# Patient Record
Sex: MALE | Race: WHITE | NOT HISPANIC OR LATINO | Employment: OTHER | URBAN - METROPOLITAN AREA
[De-identification: names, ages, dates, MRNs, and addresses within clinical notes are randomized per-mention and may not be internally consistent; named-entity substitution may affect disease eponyms.]

---

## 2022-10-18 PROBLEM — I21.02 STEMI INVOLVING LEFT ANTERIOR DESCENDING CORONARY ARTERY: Status: ACTIVE | Noted: 2022-08-03

## 2022-10-18 PROBLEM — E78.2 MIXED HYPERLIPIDEMIA: Status: ACTIVE | Noted: 2022-08-03

## 2022-10-18 RX ORDER — ASPIRIN 81 MG/1
81 TABLET ORAL DAILY
COMMUNITY
Start: 2022-08-04

## 2022-10-18 RX ORDER — ROSUVASTATIN CALCIUM 20 MG/1
20 TABLET, COATED ORAL DAILY
COMMUNITY
Start: 2022-08-04 | End: 2022-10-19

## 2022-10-18 RX ORDER — METOPROLOL SUCCINATE 25 MG/1
25 TABLET, EXTENDED RELEASE ORAL DAILY
COMMUNITY
Start: 2022-08-04

## 2022-10-18 RX ORDER — NITROGLYCERIN 0.4 MG/1
0.4 TABLET SUBLINGUAL
COMMUNITY
Start: 2022-08-04

## 2022-10-18 NOTE — PROGRESS NOTES
Encounter Date:10/19/2022      Patient ID: Luis Miguel Salazar is a 61 y.o. male.    Chief Complaint   Patient presents with   • Consult          History of Present Illness  Luis Miguel is a 61-year-old with past medical history of hypertension, hyperlipidemia, tobacco abuse and a recent anterior wall STEMI status post PCI to the LAD who presents to [facility] care.    [facility] with anterior STEMI.  He was found to have 99% mid LAD stenosis with thrombus and JULISSA II flow.  He underwent Pronto thrombectomy and stenting of the mid LAD with placement of a 3 oh by 20 mm Synergy drug-eluting stent postdilated with a 3.5 mm noncompliant balloon.  There was noted to be 30% plaque beyond the stented segment.  Is also noted to have 30 to 40% stenosis in the proximal LAD.  Left circumflex is a dominant vessel with 30% stenosis in the mid vessel.  RCA is a nondominant vessel with 50% stenosis in the proximal segment.  Echocardiogram showed LVEF of 55 to 60% with severe apical and septal hypokinesis.  No valvular heart disease.    Since then he has been doing well and denies any further episodes of chest pain.  He does mention that he has been getting short of breath with minimal exertion.  This is new since his PCI.  As mentioned above his echocardiogram was within normal limits.  He has been taking his Brilinta.  Denies any bleeding issues.  Denies any orthopnea, PND, or lower extremity edema.  Has not started cardiac rehab at this time.    He continues to smoke about 1 pack/day.  He was smoking up to 2 packs/day up until a few months ago.  He would like to quit but he does not want to use any aids at this time.  Discussed risks of tobacco and smoking for his cardiac health.    The following portions of the patient's history were reviewed and updated as appropriate: allergies, current medications, past family history, past medical history, past social history, past surgical history, and problem  list.    Review of Systems   Constitutional: Negative for fever and malaise/fatigue.   Cardiovascular: Negative for chest pain, dyspnea on exertion and palpitations.   Respiratory: Negative for cough and shortness of breath.    Skin: Negative for rash.   Gastrointestinal: Negative for abdominal pain, nausea and vomiting.   Neurological: Negative for focal weakness and headaches.   All other systems reviewed and are negative.        Current Outpatient Medications:   •  aspirin 81 MG EC tablet, Take 1 tablet by mouth Daily., Disp: , Rfl:   •  metoprolol succinate XL (TOPROL-XL) 25 MG 24 hr tablet, Take 1 tablet by mouth Daily., Disp: , Rfl:   •  nitroglycerin (NITROSTAT) 0.4 MG SL tablet, Place 1 tablet under the tongue., Disp: , Rfl:   •  clopidogrel (PLAVIX) 75 MG tablet, Take 1 tablet by mouth Daily., Disp: 90 tablet, Rfl: 3  •  rosuvastatin (CRESTOR) 40 MG tablet, Take 1 tablet by mouth Daily., Disp: 90 tablet, Rfl: 3    No Known Allergies    Family History   Problem Relation Age of Onset   • Lung cancer Mother    • Heart disease Father        Past Surgical History:   Procedure Laterality Date   • CARDIAC CATHETERIZATION     • CORONARY STENT PLACEMENT     • HERNIA REPAIR     • LIPOMA RESECTION     • LUMBAR SPINE SURGERY     • THROMBECTOMY         Past Medical History:   Diagnosis Date   • Hyperlipidemia    • Myocardial infarction (HCC)    • Spinal stenosis, multiple sites in spine    • STEMI (ST elevation myocardial infarction) (HCC)    • Thrombus        Social History     Socioeconomic History   • Marital status:    Tobacco Use   • Smoking status: Every Day     Types: Cigarettes   • Smokeless tobacco: Never   Vaping Use   • Vaping Use: Never used   Substance and Sexual Activity   • Alcohol use: Not Currently   • Drug use: Not Currently     Types: Marijuana   • Sexual activity: Defer           ECG 12 Lead    Date/Time: 10/19/2022 2:27 PM  Performed by: Zoya Monahan MD  Authorized by: Zoya Monahan,  "MD   Comparison: not compared with previous ECG   Previous ECG: no previous ECG available  Rhythm: sinus rhythm  Rate: normal  BPM: 68  Conduction: conduction normal  QRS axis: normal  Comments: Early R wave progression with T wave inversions in V2 and V3              Objective:       Physical Exam    /90   Pulse 73   Ht 175.3 cm (69\")   Wt 68.7 kg (151 lb 6.4 oz)   SpO2 97%   BMI 22.36 kg/m²   The patient is alert, oriented and in no distress.  Walks with a cane    Vital signs as noted above.    Head and neck revealed no carotid bruits or jugular venous distension.  No thyromegaly or lymphadenopathy is present.    Lungs clear.  No wheezing.  Breath sounds are normal bilaterally.    Heart normal first and second heart sounds.  No murmur..  No pericardial rub is present.  No gallop is present.    Abdomen soft and nontender.  No organomegaly is present.    Extremities revealed good peripheral pulses without any pedal edema.    Skin warm and dry.    Musculoskeletal system is grossly normal.    CNS grossly normal.           Diagnosis Plan   1. STEMI involving left anterior descending coronary artery (HCC)        2. Status post insertion of drug-eluting stent into left anterior descending (LAD) artery for coronary artery disease  Ambulatory Referral to Cardiac Rehab      3. Mixed hyperlipidemia        4. Essential hypertension        5. Tobacco abuse        LAB RESULTS (LAST 7 DAYS)    CBC        BMP        CMP         BNP        TROPONIN        CoAg        Creatinine Clearance  CrCl cannot be calculated (Patient's most recent lab result is older than the maximum 30 days allowed.).    ABG        Radiology  No radiology results for the last day         Assessment and Plan       Diagnoses and all orders for this visit:    1. STEMI involving left anterior descending coronary artery (HCC) (Primary)  Assessment & Plan:  Status post PCI of the LAD  Switch Brilinta to Plavix given shortness of breath  Continue with " aspirin  Statin  Metoprolol  Referral to cardiac rehab      2. Status post insertion of drug-eluting stent into left anterior descending (LAD) artery for coronary artery disease  -     Ambulatory Referral to Cardiac Rehab    3. Mixed hyperlipidemia  Assessment & Plan:  Increase rosuvastatin to 40 mg  Repeat lipid panel in 3 months      4. Essential hypertension  Assessment & Plan:  Continue with metoprolol      5. Tobacco abuse  Assessment & Plan:  I have provided smoking cessation counseling for the patient today. I extensively discussed with the patient the cardiovascular risks associated with smoking and other tobacco products. Patient stated understanding and their questions were answered to their satisfaction.  Currently does not want to use any aids      Other orders  -     clopidogrel (PLAVIX) 75 MG tablet; Take 1 tablet by mouth Daily.  Dispense: 90 tablet; Refill: 3  -     rosuvastatin (CRESTOR) 40 MG tablet; Take 1 tablet by mouth Daily.  Dispense: 90 tablet; Refill: 3         Zoya Monahan MD

## 2022-10-19 ENCOUNTER — OFFICE VISIT (OUTPATIENT)
Dept: CARDIOLOGY | Facility: CLINIC | Age: 62
End: 2022-10-19

## 2022-10-19 VITALS
OXYGEN SATURATION: 97 % | HEIGHT: 69 IN | WEIGHT: 151.4 LBS | BODY MASS INDEX: 22.42 KG/M2 | DIASTOLIC BLOOD PRESSURE: 90 MMHG | SYSTOLIC BLOOD PRESSURE: 145 MMHG | HEART RATE: 73 BPM

## 2022-10-19 DIAGNOSIS — I10 ESSENTIAL HYPERTENSION: ICD-10-CM

## 2022-10-19 DIAGNOSIS — E78.2 MIXED HYPERLIPIDEMIA: ICD-10-CM

## 2022-10-19 DIAGNOSIS — Z72.0 TOBACCO ABUSE: ICD-10-CM

## 2022-10-19 DIAGNOSIS — I21.02 STEMI INVOLVING LEFT ANTERIOR DESCENDING CORONARY ARTERY: Primary | ICD-10-CM

## 2022-10-19 DIAGNOSIS — Z95.5 STATUS POST INSERTION OF DRUG-ELUTING STENT INTO LEFT ANTERIOR DESCENDING (LAD) ARTERY FOR CORONARY ARTERY DISEASE: ICD-10-CM

## 2022-10-19 PROCEDURE — 93000 ELECTROCARDIOGRAM COMPLETE: CPT | Performed by: INTERNAL MEDICINE

## 2022-10-19 PROCEDURE — 99204 OFFICE O/P NEW MOD 45 MIN: CPT | Performed by: INTERNAL MEDICINE

## 2022-10-19 RX ORDER — CLOPIDOGREL BISULFATE 75 MG/1
75 TABLET ORAL DAILY
Qty: 90 TABLET | Refills: 3 | Status: SHIPPED | OUTPATIENT
Start: 2022-10-19

## 2022-10-19 RX ORDER — ROSUVASTATIN CALCIUM 40 MG/1
40 TABLET, COATED ORAL DAILY
Qty: 90 TABLET | Refills: 3 | Status: SHIPPED | OUTPATIENT
Start: 2022-10-19

## 2022-10-19 NOTE — ASSESSMENT & PLAN NOTE
Increase rosuvastatin to 40 mg  Repeat lipid panel in 3 months   Mirvaso Counseling: Mirvaso is a topical medication which can decrease superficial blood flow where applied. Side effects are uncommon and include stinging, redness and allergic reactions.

## 2022-10-19 NOTE — ASSESSMENT & PLAN NOTE
Status post PCI of the LAD  Switch Brilinta to Plavix given shortness of breath  Continue with aspirin  Statin  Metoprolol  Referral to cardiac rehab

## 2022-10-19 NOTE — ASSESSMENT & PLAN NOTE
I have provided smoking cessation counseling for the patient today. I extensively discussed with the patient the cardiovascular risks associated with smoking and other tobacco products. Patient stated understanding and their questions were answered to their satisfaction.  Currently does not want to use any aids

## 2022-10-20 ENCOUNTER — TELEPHONE (OUTPATIENT)
Dept: CARDIAC REHAB | Facility: HOSPITAL | Age: 62
End: 2022-10-20

## 2022-10-21 ENCOUNTER — TELEPHONE (OUTPATIENT)
Dept: CARDIOLOGY | Facility: CLINIC | Age: 62
End: 2022-10-21

## 2022-10-21 DIAGNOSIS — E78.2 MIXED HYPERLIPIDEMIA: Primary | ICD-10-CM

## 2022-10-21 NOTE — TELEPHONE ENCOUNTER
PATIENT NEEDS CALL BACK TO GO OVER MEDICATIONS. HE WAS IN OFFICE AND DR. LUONG CALLED IN MEDICATIONS. PHARMACY TELLING PATIENT HE WAS ALREADY ON ONE OF THOSE MEDS.  PATIENT COULD NOT TELL ME WHAT MEDICATION HE WAS REFERRING TO. HE IS ONLY GOING TO BE HOME FOR ABOUT 30 MINS. WOULD LIKE CALL BACK SOON.

## 2022-10-21 NOTE — TELEPHONE ENCOUNTER
Tried to phone patient, unable to reach. From patient's last office note, it looks like his Brilinta was discontinued & he was changed to Plavix. Will try later today to reach.

## 2022-10-21 NOTE — TELEPHONE ENCOUNTER
I could not get a hold of the patient, I called the pharmacy to see what exactly the issue was. You increased his Rosuvastatin to 40 mg, but according to the pharmacist, it was already increased to 40 mg on 9-7-22. I don't believe he has increased his dose yet. FYI. I will try to reach him again on Monday to go over this information with him.

## 2022-10-24 ENCOUNTER — LAB (OUTPATIENT)
Dept: LAB | Facility: HOSPITAL | Age: 62
End: 2022-10-24

## 2022-10-24 ENCOUNTER — TELEPHONE (OUTPATIENT)
Dept: CARDIAC REHAB | Facility: HOSPITAL | Age: 62
End: 2022-10-24

## 2022-10-24 DIAGNOSIS — E78.2 MIXED HYPERLIPIDEMIA: ICD-10-CM

## 2022-10-24 LAB
ALBUMIN SERPL-MCNC: 4.1 G/DL (ref 3.5–5.2)
ALP SERPL-CCNC: 72 U/L (ref 39–117)
ALT SERPL W P-5'-P-CCNC: 42 U/L (ref 1–41)
AST SERPL-CCNC: 33 U/L (ref 1–40)
BILIRUB CONJ SERPL-MCNC: <0.2 MG/DL (ref 0–0.3)
BILIRUB INDIRECT SERPL-MCNC: ABNORMAL MG/DL
BILIRUB SERPL-MCNC: 0.3 MG/DL (ref 0–1.2)
PROT SERPL-MCNC: 7 G/DL (ref 6–8.5)

## 2022-10-24 PROCEDURE — 36415 COLL VENOUS BLD VENIPUNCTURE: CPT

## 2022-10-24 PROCEDURE — 80076 HEPATIC FUNCTION PANEL: CPT

## 2022-10-24 NOTE — TELEPHONE ENCOUNTER
Finally spoke with the patient, he gave us his old number to call him, he said he has been feeling foggy lately since increasing his Rosuvastatin. Pt states that his PCP had increased him from 20 to 40 & told him to just take 2 of the 20 mg & she sent in a new rx for the 40 mg, he said the pharmacy did not tell him that it was a different tablet & he was taking 2 40 mg tablets for a total of 80 mg daily for the past month. He said he was going to stop taking them for the next 5 days & then resume. Do you want to check his liver functions since he quadrupled his dose?

## 2022-10-24 NOTE — TELEPHONE ENCOUNTER
"Patient left message that he has been waiting since Friday for phone call. I tried to call him back and got message \"caller is not accepting calls at this time.\"   "

## 2022-10-26 ENCOUNTER — TELEPHONE (OUTPATIENT)
Dept: CARDIAC REHAB | Facility: HOSPITAL | Age: 62
End: 2022-10-26

## 2022-10-31 ENCOUNTER — TELEPHONE (OUTPATIENT)
Dept: CARDIAC REHAB | Facility: HOSPITAL | Age: 62
End: 2022-10-31

## 2022-10-31 NOTE — TELEPHONE ENCOUNTER
Called pt to schedule to start. Pt states he cannot walk because of his sciatic nerve issues and he falls at anytime. When asked if he has tried PT patient became agitated and used obscenities when describing experience with PT. Told him we will hold off on Cardiac Rehab for now.

## 2023-04-27 ENCOUNTER — OFFICE VISIT (OUTPATIENT)
Dept: CARDIOLOGY | Facility: CLINIC | Age: 63
End: 2023-04-27
Payer: MEDICAID

## 2023-04-27 VITALS
HEART RATE: 63 BPM | WEIGHT: 147 LBS | OXYGEN SATURATION: 96 % | HEIGHT: 69 IN | DIASTOLIC BLOOD PRESSURE: 67 MMHG | BODY MASS INDEX: 21.77 KG/M2 | SYSTOLIC BLOOD PRESSURE: 136 MMHG

## 2023-04-27 DIAGNOSIS — E78.5 HYPERLIPIDEMIA LDL GOAL <70: ICD-10-CM

## 2023-04-27 DIAGNOSIS — I21.02 STEMI INVOLVING LEFT ANTERIOR DESCENDING CORONARY ARTERY: Primary | ICD-10-CM

## 2023-04-27 DIAGNOSIS — I10 ESSENTIAL HYPERTENSION: ICD-10-CM

## 2023-04-27 DIAGNOSIS — Z72.0 TOBACCO ABUSE: ICD-10-CM

## 2023-04-27 RX ORDER — AMLODIPINE BESYLATE 5 MG/1
5 TABLET ORAL DAILY
Qty: 90 TABLET | Refills: 3 | Status: SHIPPED | OUTPATIENT
Start: 2023-04-27

## 2023-04-27 RX ORDER — ATORVASTATIN CALCIUM 40 MG/1
1 TABLET, FILM COATED ORAL NIGHTLY
COMMUNITY
Start: 2023-03-17

## 2023-04-27 NOTE — PROGRESS NOTES
Encounter Date:10/19/2022      Patient ID: Luis Miguel Salazar is a 62 y.o. male.    Chief Complaint   Patient presents with   • Coronary Artery Disease          History of Present Illness  Luis Miguel is a 61-year-old with past medical history of hypertension, hyperlipidemia, tobacco abuse and a recent anterior wall STEMI status post PCI to the LAD who presents for follow-up    Presented to [facility] on 8/3/2022 with anterior STEMI.  He was found to have 99% mid LAD stenosis with thrombus and JULISSA II flow.  He underwent Pronto thrombectomy and stenting of the mid LAD with placement of a 3.0x20 mm Synergy drug-eluting stent postdilated with a 3.5 mm noncompliant balloon.  There was noted to be 30% plaque beyond the stented segment.  Is also noted to have 30 to 40% stenosis in the proximal LAD.  Left circumflex is a dominant vessel with 30% stenosis in the mid vessel.  RCA is a nondominant vessel with 50% stenosis in the proximal segment.  Echocardiogram showed LVEF of 55 to 60% with severe apical and septal hypokinesis.  No valvular heart disease.    He has been doing well since I last saw him.  Denies any chest pain.  His shortness of breath improved when we switched him to Plavix.  He still has some baseline chronic shortness of breath from his smoking.  He is limited in his mobility due to his sciatica.  For this reason he also did not do cardiac rehab.  He remains compliant with medications.  His last lipid panel showed a total cholesterol of 294 with an LDL of 235.  Denies any orthopnea or PND.  No lower extremity edema.    He continues to smoke about 1 pack/day.      The following portions of the patient's history were reviewed and updated as appropriate: allergies, current medications, past family history, past medical history, past social history, past surgical history, and problem list.    Review of Systems   Constitutional: Positive for malaise/fatigue. Negative for fever.   Cardiovascular: Negative for chest  pain, dyspnea on exertion, leg swelling and palpitations.   Respiratory: Negative for cough and shortness of breath.    Skin: Negative for rash.   Gastrointestinal: Negative for abdominal pain, nausea and vomiting.   Neurological: Positive for dizziness, light-headedness and numbness. Negative for focal weakness and headaches.   All other systems reviewed and are negative.        Current Outpatient Medications:   •  aspirin 81 MG EC tablet, Take 1 tablet by mouth Daily., Disp: , Rfl:   •  atorvastatin (LIPITOR) 40 MG tablet, Take 1 tablet by mouth Every Night., Disp: , Rfl:   •  clopidogrel (PLAVIX) 75 MG tablet, Take 1 tablet by mouth Daily., Disp: 90 tablet, Rfl: 3  •  metoprolol succinate XL (TOPROL-XL) 25 MG 24 hr tablet, Take 1 tablet by mouth Daily., Disp: , Rfl:   •  nitroglycerin (NITROSTAT) 0.4 MG SL tablet, Place 1 tablet under the tongue., Disp: , Rfl:   •  amLODIPine (NORVASC) 5 MG tablet, Take 1 tablet by mouth Daily., Disp: 90 tablet, Rfl: 3    No Known Allergies    Family History   Problem Relation Age of Onset   • Lung cancer Mother    • Heart disease Father        Past Surgical History:   Procedure Laterality Date   • CARDIAC CATHETERIZATION     • CORONARY STENT PLACEMENT     • HERNIA REPAIR     • LIPOMA RESECTION     • LUMBAR SPINE SURGERY     • THROMBECTOMY         Past Medical History:   Diagnosis Date   • Hyperlipidemia    • Myocardial infarction    • Spinal stenosis, multiple sites in spine    • STEMI (ST elevation myocardial infarction)    • Thrombus        Social History     Socioeconomic History   • Marital status:    Tobacco Use   • Smoking status: Every Day     Types: Cigarettes   • Smokeless tobacco: Never   Vaping Use   • Vaping Use: Never used   Substance and Sexual Activity   • Alcohol use: Not Currently   • Drug use: Not Currently     Types: Marijuana   • Sexual activity: Defer         Procedures      Objective:       Physical Exam    /67 (BP Location: Right arm, Patient  "Position: Sitting, Cuff Size: Adult)   Pulse 63   Ht 175.3 cm (69\")   Wt 66.7 kg (147 lb)   SpO2 96%   BMI 21.71 kg/m²   The patient is alert, oriented and in no distress.  Walking with a cane    Vital signs as noted above.    Head and neck revealed no carotid bruits or jugular venous distension.  No thyromegaly or lymphadenopathy is present.    Lungs clear.  No wheezing.  Breath sounds are normal bilaterally.    Heart normal first and second heart sounds.  No murmur..  No pericardial rub is present.  No gallop is present.    Abdomen soft and nontender.  No organomegaly is present.    Extremities revealed good peripheral pulses without any pedal edema.    Skin warm and dry.    Musculoskeletal system is grossly normal.    CNS grossly normal.           Diagnosis Plan   1. STEMI involving left anterior descending coronary artery        2. Hyperlipidemia LDL goal <70  Lipid Panel      3. Essential hypertension        4. Tobacco abuse        LAB RESULTS (LAST 7 DAYS)    CBC        BMP        CMP         BNP        TROPONIN        CoAg        Creatinine Clearance  CrCl cannot be calculated (Patient's most recent lab result is older than the maximum 30 days allowed.).    ABG        Radiology  No radiology results for the last day         Assessment and Plan       Diagnoses and all orders for this visit:    1. STEMI involving left anterior descending coronary artery (Primary)    2. Hyperlipidemia LDL goal <70  -     Lipid Panel; Future    3. Essential hypertension    4. Tobacco abuse    Other orders  -     amLODIPine (NORVASC) 5 MG tablet; Take 1 tablet by mouth Daily.  Dispense: 90 tablet; Refill: 3           Coronary artery disease  Status post PCI of the LAD  Continue with aspirin and Plavix   continue statin and beta-blocker  Unable to do cardiac rehab due to sciatica    Hyperlipidemia  Currently on atorvastatin 40 mg  Repeat lipid panel ordered  Goal LDL less than 70    Hypertension  Continue with metoprolol   add " amlodipine 5 mg    Tobacco abuse  I have provided smoking cessation counseling for the patient today. I extensively discussed with the patient the cardiovascular risks associated with smoking and other tobacco products. Patient stated understanding and their questions were answered to their satisfaction.  I also offered low-dose CT for lung cancer screening but he does not want to do that at this time.      Zoya Monahan MD

## 2023-05-02 ENCOUNTER — TELEPHONE (OUTPATIENT)
Dept: CARDIOLOGY | Facility: CLINIC | Age: 63
End: 2023-05-02
Payer: MEDICAID

## 2023-05-02 RX ORDER — CLOPIDOGREL BISULFATE 75 MG/1
75 TABLET ORAL DAILY
Qty: 90 TABLET | Refills: 3 | Status: SHIPPED | OUTPATIENT
Start: 2023-05-02

## 2023-05-02 NOTE — TELEPHONE ENCOUNTER
Incoming Refill Request      Medication requested (name and dose): CLOPIDOGREL 75 MG    Pharmacy where request should be sent: [facility]    Additional details provided by patient:     Best call back number:[phone]  Does the patient have less than a 3 day supply:  [] Yes  [x] No    Xuan Szymanski Rep  05/02/23, 10:31 EDT

## 2023-11-01 NOTE — PROGRESS NOTES
Encounter Date:10/19/2022      Patient ID: Luis Miguel Salazar is a 62 y.o. male.    Chief Complaint   Patient presents with    Follow-up          History of Present Illness  Luis Miguel is a 61-year-old with past medical history of hypertension, hyperlipidemia, tobacco abuse and anterior wall STEMI status post PCI to the LAD who presents for follow-up    Continues to do well from a cardiac standpoint.  Denies any chest pain or shortness of breath.  Continues to smoke.  Mentions that he recently had a lipid panel done with his PCP and he does not know the results of this but his atorvastatin was increased to 80 mg after that.  Denies any bleeding issues but does mention that he bleeds easily if he nicks or cuts himself.  No plans to quit smoking.    Previous note:  Presented to [facility] on 8/3/2022 with anterior STEMI.  He was found to have 99% mid LAD stenosis with thrombus and JULISSA II flow.  He underwent Pronto thrombectomy and stenting of the mid LAD with placement of a 3.0x20 mm Synergy drug-eluting stent postdilated with a 3.5 mm noncompliant balloon.  There was noted to be 30% plaque beyond the stented segment.  Is also noted to have 30 to 40% stenosis in the proximal LAD.  Left circumflex is a dominant vessel with 30% stenosis in the mid vessel.  RCA is a nondominant vessel with 50% stenosis in the proximal segment.  Echocardiogram showed LVEF of 55 to 60% with severe apical and septal hypokinesis.  No valvular heart disease.  His last lipid panel showed a total cholesterol of 294 with an LDL of 235.      The following portions of the patient's history were reviewed and updated as appropriate: allergies, current medications, past family history, past medical history, past social history, past surgical history, and problem list.    Review of Systems   Constitutional: Negative for fever and malaise/fatigue.   Cardiovascular:  Negative for chest pain, dyspnea on exertion, leg swelling and palpitations.    Respiratory:  Negative for cough and shortness of breath.    Skin:  Negative for rash.   Gastrointestinal:  Negative for abdominal pain, nausea and vomiting.   Neurological:  Positive for dizziness, light-headedness and numbness. Negative for focal weakness and headaches.   All other systems reviewed and are negative.        Current Outpatient Medications:     amLODIPine (NORVASC) 5 MG tablet, Take 1 tablet by mouth Daily., Disp: 90 tablet, Rfl: 3    aspirin 81 MG EC tablet, Take 1 tablet by mouth Daily., Disp: , Rfl:     atorvastatin (LIPITOR) 80 MG tablet, Take 1 tablet by mouth Every Night., Disp: , Rfl:     metoprolol succinate XL (TOPROL-XL) 25 MG 24 hr tablet, Take 1 tablet by mouth Daily., Disp: , Rfl:     nitroglycerin (NITROSTAT) 0.4 MG SL tablet, Place 1 tablet under the tongue., Disp: , Rfl:     No Known Allergies    Family History   Problem Relation Age of Onset    Lung cancer Mother     Heart disease Father        Past Surgical History:   Procedure Laterality Date    CARDIAC CATHETERIZATION      CORONARY STENT PLACEMENT      HERNIA REPAIR      LIPOMA RESECTION      LUMBAR SPINE SURGERY      THROMBECTOMY         Past Medical History:   Diagnosis Date    Hyperlipidemia     Myocardial infarction     Spinal stenosis, multiple sites in spine     STEMI (ST elevation myocardial infarction)     Thrombus        Social History     Socioeconomic History    Marital status:    Tobacco Use    Smoking status: Every Day     Types: Cigarettes    Smokeless tobacco: Never   Vaping Use    Vaping Use: Never used   Substance and Sexual Activity    Alcohol use: Not Currently    Drug use: Not Currently     Types: Marijuana    Sexual activity: Defer           ECG 12 Lead    Date/Time: 11/2/2023 3:06 PM  Performed by: Zoya Monahan MD    Authorized by: Zoya Monahan MD  Comparison: compared with previous ECG   Similar to previous ECG  Rhythm: sinus rhythm  Rate: normal  BPM: 59  Conduction: conduction normal  QRS  "axis: normal    Clinical impression: normal ECG            Objective:       Physical Exam    /75 (BP Location: Left arm, Patient Position: Sitting)   Pulse 59   Ht 175.3 cm (69\")   Wt 65.3 kg (144 lb)   SpO2 99%   BMI 21.27 kg/m²   The patient is alert, oriented and in no distress.  Walking with a cane    Vital signs as noted above.    Head and neck revealed no carotid bruits or jugular venous distension.  No thyromegaly or lymphadenopathy is present.    Lungs clear.  No wheezing.  Breath sounds are normal bilaterally.    Heart normal first and second heart sounds.  No murmur..  No pericardial rub is present.  No gallop is present.    Abdomen soft and nontender.  No organomegaly is present.    Extremities revealed good peripheral pulses without any pedal edema.    Skin warm and dry.    Musculoskeletal system is grossly normal.    CNS grossly normal.           Diagnosis Plan   1. STEMI involving left anterior descending coronary artery        2. Essential hypertension        3. Tobacco abuse        4. Mixed hyperlipidemia          LAB RESULTS (LAST 7 DAYS)    CBC        BMP        CMP         BNP        TROPONIN        CoAg        Creatinine Clearance  CrCl cannot be calculated (Patient's most recent lab result is older than the maximum 30 days allowed.).    ABG        Radiology  No radiology results for the last day         Assessment and Plan       Diagnoses and all orders for this visit:    1. STEMI involving left anterior descending coronary artery (Primary)    2. Essential hypertension    3. Tobacco abuse    4. Mixed hyperlipidemia    Other orders  -     ECG 12 Lead             Coronary artery disease  Status post PCI of the LAD  Continue with aspirin   And stop Plavix now given that he is more than 12 months out from his PCI  continue statin and beta-blocker  Unable to do cardiac rehab due to sciatica    Hyperlipidemia  Continue atorvastatin 80 mg  Obtain lipid panel results from PCP  Goal LDL less " than 70    Hypertension  Continue with metoprolol   amlodipine 5 mg    Tobacco abuse  I have provided smoking cessation counseling for the patient today. I extensively discussed with the patient the cardiovascular risks associated with smoking and other tobacco products. Patient stated understanding and their questions were answered to their satisfaction.  I also offered low-dose CT for lung cancer screening but he does not want to do that at this time.      Zoya Monahan MD

## 2023-11-02 ENCOUNTER — OFFICE VISIT (OUTPATIENT)
Dept: CARDIOLOGY | Facility: CLINIC | Age: 63
End: 2023-11-02
Payer: MEDICAID

## 2023-11-02 VITALS
WEIGHT: 144 LBS | BODY MASS INDEX: 21.33 KG/M2 | SYSTOLIC BLOOD PRESSURE: 131 MMHG | HEIGHT: 69 IN | DIASTOLIC BLOOD PRESSURE: 75 MMHG | HEART RATE: 59 BPM | OXYGEN SATURATION: 99 %

## 2023-11-02 DIAGNOSIS — E78.2 MIXED HYPERLIPIDEMIA: ICD-10-CM

## 2023-11-02 DIAGNOSIS — I10 ESSENTIAL HYPERTENSION: ICD-10-CM

## 2023-11-02 DIAGNOSIS — I21.02 STEMI INVOLVING LEFT ANTERIOR DESCENDING CORONARY ARTERY: Primary | ICD-10-CM

## 2023-11-02 DIAGNOSIS — Z72.0 TOBACCO ABUSE: ICD-10-CM

## 2024-02-06 RX ORDER — AMLODIPINE BESYLATE 5 MG/1
5 TABLET ORAL DAILY
Qty: 90 TABLET | Refills: 2 | Status: SHIPPED | OUTPATIENT
Start: 2024-02-06

## 2024-02-06 NOTE — TELEPHONE ENCOUNTER
Caller: Luis Miguel Salazar JOEL    Relationship: Self    Best call back number: [phone]    Requested Prescriptions:   Requested Prescriptions     Pending Prescriptions Disp Refills    amLODIPine (NORVASC) 5 MG tablet 90 tablet 3     Sig: Take 1 tablet by mouth Daily.        Pharmacy where request should be sent: [facility]/PHARMACY #[id number] - [facility] [place] [address] [phone] [facility] [phone] FX     Last office visit with prescribing clinician: 11/2/2023   Last telemedicine visit with prescribing clinician: Visit date not found   Next office visit with prescribing clinician: 5/2/2024     Additional details provided by patient: PT IS COMPLETELY OUT OF MEDICATION. PT STATES HIS OLD PHARMACY IS SHUTTING DOWN AND HE HAD TO FIND A NEW PHARMACY.    Does the patient have less than a 3 day supply:  [x] Yes  [] No    Would you like a call back once the refill request has been completed: [x] Yes [] No    If the office needs to give you a call back, can they leave a voicemail: [x] Yes [] No    Xuan Bruner Rep   02/06/24 13:15 EST

## 2024-02-06 NOTE — TELEPHONE ENCOUNTER
Rx Refill Note  Requested Prescriptions     Pending Prescriptions Disp Refills    amLODIPine (NORVASC) 5 MG tablet 90 tablet 3     Sig: Take 1 tablet by mouth Daily.      Last office visit with prescribing clinician: 11/2/2023   Last telemedicine visit with prescribing clinician: Visit date not found   Next office visit with prescribing clinician: 5/2/2024                         Would you like a call back once the refill request has been completed: [] Yes [] No    If the office needs to give you a call back, can they leave a voicemail: [] Yes [] No    Tracie Mills MA  02/06/24, 13:33 EST

## 2024-05-03 ENCOUNTER — OFFICE VISIT (OUTPATIENT)
Dept: CARDIOLOGY | Facility: CLINIC | Age: 64
End: 2024-05-03
Payer: MEDICAID

## 2024-05-03 VITALS
WEIGHT: 148 LBS | OXYGEN SATURATION: 96 % | HEIGHT: 69 IN | SYSTOLIC BLOOD PRESSURE: 158 MMHG | BODY MASS INDEX: 21.92 KG/M2 | DIASTOLIC BLOOD PRESSURE: 89 MMHG | HEART RATE: 82 BPM

## 2024-05-03 DIAGNOSIS — I10 ESSENTIAL HYPERTENSION: ICD-10-CM

## 2024-05-03 DIAGNOSIS — E78.2 MIXED HYPERLIPIDEMIA: ICD-10-CM

## 2024-05-03 DIAGNOSIS — I21.02 STEMI INVOLVING LEFT ANTERIOR DESCENDING CORONARY ARTERY: Primary | ICD-10-CM

## 2024-05-03 DIAGNOSIS — Z72.0 TOBACCO ABUSE: ICD-10-CM

## 2024-05-03 RX ORDER — EZETIMIBE 10 MG/1
10 TABLET ORAL DAILY
Qty: 90 TABLET | Refills: 3 | Status: SHIPPED | OUTPATIENT
Start: 2024-05-03

## 2024-05-03 RX ORDER — AMLODIPINE BESYLATE 10 MG/1
10 TABLET ORAL DAILY
Qty: 90 TABLET | Refills: 3 | Status: SHIPPED | OUTPATIENT
Start: 2024-05-03

## 2024-05-03 NOTE — PROGRESS NOTES
Encounter Date:10/19/2022      Patient ID: Luis Miguel Salazar is a 63 y.o. male.    Chief Complaint   Patient presents with    Hyperlipidemia    Hypertension    Coronary Artery Disease          History of Present Illness  Luis Miguel is a 61-year-old with past medical history of hypertension, hyperlipidemia, tobacco abuse and anterior wall STEMI status post PCI to the LAD who presents for follow-up  Doing well from cardiac standpoint.  Denies any chest pain or shortness of breath.  He has cut down on smoking.  He also mentions he was eating a lot of fried foods and is now taking his meat and trying to change his diet to get his cholesterol under control.      His lipid panel in December showed total cholesterol of 195 with an LDL of 131    Previous note:  Presented to [facility] on 8/3/2022 with anterior STEMI.  He was found to have 99% mid LAD stenosis with thrombus and JULISSA II flow.  He underwent Pronto thrombectomy and stenting of the mid LAD with placement of a 3.0x20 mm Synergy drug-eluting stent postdilated with a 3.5 mm noncompliant balloon.  There was noted to be 30% plaque beyond the stented segment.  Is also noted to have 30 to 40% stenosis in the proximal LAD.  Left circumflex is a dominant vessel with 30% stenosis in the mid vessel.  RCA is a nondominant vessel with 50% stenosis in the proximal segment.  Echocardiogram showed LVEF of 55 to 60% with severe apical and septal hypokinesis.  No valvular heart disease.  His last lipid panel showed a total cholesterol of 294 with an LDL of 235.      The following portions of the patient's history were reviewed and updated as appropriate: allergies, current medications, past family history, past medical history, past social history, past surgical history, and problem list.    Review of Systems   Constitutional: Negative for fever and malaise/fatigue.   Cardiovascular:  Negative for chest pain, dyspnea on exertion, leg swelling and palpitations.   Respiratory:   Negative for cough and shortness of breath.    Skin:  Negative for rash.   Gastrointestinal:  Negative for abdominal pain, nausea and vomiting.   Neurological:  Positive for dizziness, light-headedness and numbness. Negative for focal weakness and headaches.   All other systems reviewed and are negative.        Current Outpatient Medications:     amLODIPine (NORVASC) 10 MG tablet, Take 1 tablet by mouth Daily., Disp: 90 tablet, Rfl: 3    aspirin 81 MG EC tablet, Take 1 tablet by mouth Daily., Disp: , Rfl:     nitroglycerin (NITROSTAT) 0.4 MG SL tablet, Place 1 tablet under the tongue., Disp: , Rfl:     atorvastatin (LIPITOR) 80 MG tablet, Take 1 tablet by mouth Every Night., Disp: , Rfl:     ezetimibe (ZETIA) 10 MG tablet, Take 1 tablet by mouth Daily., Disp: 90 tablet, Rfl: 3    metoprolol succinate XL (TOPROL-XL) 25 MG 24 hr tablet, Take 1 tablet by mouth Daily. (Patient not taking: Reported on 5/3/2024), Disp: , Rfl:     Allergies   Allergen Reactions    Codeine GI Intolerance       Family History   Problem Relation Age of Onset    Lung cancer Mother     Heart disease Father        Past Surgical History:   Procedure Laterality Date    CARDIAC CATHETERIZATION      CORONARY STENT PLACEMENT      HERNIA REPAIR      LIPOMA RESECTION      LUMBAR SPINE SURGERY      THROMBECTOMY         Past Medical History:   Diagnosis Date    Hyperlipidemia     Myocardial infarction     Spinal stenosis, multiple sites in spine     STEMI (ST elevation myocardial infarction)     Thrombus        Social History     Socioeconomic History    Marital status:    Tobacco Use    Smoking status: Every Day     Types: Cigarettes     Passive exposure: Current    Smokeless tobacco: Never   Vaping Use    Vaping status: Never Used   Substance and Sexual Activity    Alcohol use: Not Currently    Drug use: Not Currently     Types: Marijuana    Sexual activity: Defer         Procedures      Objective:       Physical Exam    /89   Pulse 82   " Ht 175.3 cm (69\")   Wt 67.1 kg (148 lb)   SpO2 96%   BMI 21.86 kg/m²   The patient is alert, oriented and in no distress.  Walking with a cane    Vital signs as noted above.    Head and neck revealed no carotid bruits or jugular venous distension.  No thyromegaly or lymphadenopathy is present.    Lungs clear.  No wheezing.  Breath sounds are normal bilaterally.    Heart normal first and second heart sounds.  No murmur..  No pericardial rub is present.  No gallop is present.    Abdomen soft and nontender.  No organomegaly is present.    Extremities revealed good peripheral pulses without any pedal edema.    Skin warm and dry.    Musculoskeletal system: Walks with a cane due to right hip pain    CNS grossly normal.           Diagnosis Plan   1. STEMI involving left anterior descending coronary artery        2. Essential hypertension        3. Mixed hyperlipidemia        4. Tobacco abuse            LAB RESULTS (LAST 7 DAYS)    CBC        BMP        CMP         BNP        TROPONIN        CoAg        Creatinine Clearance  CrCl cannot be calculated (Patient's most recent lab result is older than the maximum 30 days allowed.).    ABG        Radiology  No radiology results for the last day         Assessment and Plan       Diagnoses and all orders for this visit:    1. STEMI involving left anterior descending coronary artery (Primary)    2. Essential hypertension    3. Mixed hyperlipidemia    4. Tobacco abuse    Other orders  -     amLODIPine (NORVASC) 10 MG tablet; Take 1 tablet by mouth Daily.  Dispense: 90 tablet; Refill: 3  -     ezetimibe (ZETIA) 10 MG tablet; Take 1 tablet by mouth Daily.  Dispense: 90 tablet; Refill: 3               Coronary artery disease  Status post PCI of the LAD  Continue with aspirin   continue statin   Stopped taking beta-blocker  Unable to do cardiac rehab due to sciatica    Hyperlipidemia  Continue atorvastatin 80 mg  Add Zetia 10 mg  Goal LDL less than 70    Hypertension  Increase " amlodipine to 10 mg    Tobacco abuse  I have provided smoking cessation counseling for the patient today. I extensively discussed with the patient the cardiovascular risks associated with smoking and other tobacco products. Patient stated understanding and their questions were answered to their satisfaction.        Zoya Monahan MD

## 2024-09-10 NOTE — TELEPHONE ENCOUNTER
Caller: Martin Luis Miguel R    Relationship: Self    Best call back number: [phone]    Requested Prescriptions:   Requested Prescriptions     Pending Prescriptions Disp Refills    atorvastatin (LIPITOR) 80 MG tablet 90 tablet      Sig: Take 1 tablet by mouth Every Night.        Pharmacy where request should be sent: [facility]/PHARMACY #[id number] - [facility] [place] [address] [phone] [facility] [phone] FX     Last office visit with prescribing clinician: Visit date not found   Last telemedicine visit with prescribing clinician: Visit date not found   Next office visit with prescribing clinician: 11/12/2024     Additional details provided by patient: PATIENT STATED THAT HE HAS 1 DAY OF MEDICATION.    Does the patient have less than a 3 day supply:  [x] Yes  [] No    Would you like a call back once the refill request has been completed: [] Yes [] No    If the office needs to give you a call back, can they leave a voicemail: [] Yes [] No    Xuan Kim   09/10/24 12:54 EDT

## 2024-09-11 RX ORDER — ATORVASTATIN CALCIUM 80 MG/1
80 TABLET, FILM COATED ORAL NIGHTLY
Qty: 90 TABLET | Refills: 0 | Status: SHIPPED | OUTPATIENT
Start: 2024-09-11

## 2024-09-11 NOTE — TELEPHONE ENCOUNTER
Rx Refill Note  Requested Prescriptions     Pending Prescriptions Disp Refills    atorvastatin (LIPITOR) 80 MG tablet 90 tablet      Sig: Take 1 tablet by mouth Every Night.      Last office visit with prescribing clinician: Visit date not found   Last telemedicine visit with prescribing clinician: Visit date not found   Next office visit with prescribing clinician: 11/12/2024                         Would you like a call back once the refill request has been completed: [] Yes [] No    If the office needs to give you a call back, can they leave a voicemail: [] Yes [] No    Tracie Mills MA  09/11/24, 13:10 EDT

## 2024-10-26 NOTE — PROGRESS NOTES
Encounter Date:11/12/2024        Patient ID: Luis Miguel Salazar is a 63 y.o. male.      Chief Complaint:      History of Present Illness  Luis Miguel is a 63-year-old with past medical history of hypertension, hyperlipidemia, tobacco abuse and anterior wall STEMI status post PCI to the LAD who presents for follow-up    Previously presented to [facility] on 8/3/2022 with anterior STEMI.  He was found to have 99% mid LAD stenosis with thrombus and JULISSA II flow.  He underwent Pronto thrombectomy and stenting of the mid LAD with placement of a 3.0x20 mm Synergy drug-eluting stent postdilated with a 3.5 mm noncompliant balloon.  There was noted to be 30% plaque beyond the stented segment.  Is also noted to have 30 to 40% stenosis in the proximal LAD.  Left circumflex is a dominant vessel with 30% stenosis in the mid vessel.  RCA is a nondominant vessel with 50% stenosis in the proximal segment.  Echocardiogram showed LVEF of 55 to 60% with severe apical and septal hypokinesis.  No valvular heart disease.    Current cardiac medications include aspirin, amlodipine, atorvastatin, Zetia, metoprolol.      Patient is to undergo colonoscopy      The following portions of the patient's history were reviewed and updated as appropriate: allergies, current medications, past family history, past medical history, past social history, past surgical history, and problem list.    Review of Systems   Constitutional: Negative for malaise/fatigue.   Cardiovascular:  Negative for chest pain, leg swelling and palpitations.   Respiratory:  Negative for shortness of breath.    Skin:  Negative for rash.   Neurological:  Negative for dizziness, light-headedness and numbness.         Current Outpatient Medications:     aspirin 81 MG EC tablet, Take 1 tablet by mouth Daily., Disp: , Rfl:     nitroglycerin (NITROSTAT) 0.4 MG SL tablet, Place 1 tablet under the tongue., Disp: , Rfl:     amLODIPine (NORVASC) 10 MG tablet, Take 1 tablet by mouth Daily.,  "Disp: 90 tablet, Rfl: 3    atorvastatin (LIPITOR) 80 MG tablet, Take 1 tablet by mouth Every Night., Disp: 90 tablet, Rfl: 0    ezetimibe (ZETIA) 10 MG tablet, Take 1 tablet by mouth Daily., Disp: 90 tablet, Rfl: 3    metoprolol succinate XL (TOPROL-XL) 25 MG 24 hr tablet, Take 1 tablet by mouth Daily. (Patient not taking: Reported on 5/3/2024), Disp: , Rfl:     Current outpatient and discharge medications have been reconciled for the patient.  Reviewed by: Oscar Graves MD       Allergies   Allergen Reactions    Codeine GI Intolerance       Family History   Problem Relation Age of Onset    Lung cancer Mother     Heart disease Father        Past Surgical History:   Procedure Laterality Date    CARDIAC CATHETERIZATION      CORONARY STENT PLACEMENT      HERNIA REPAIR      LIPOMA RESECTION      LUMBAR SPINE SURGERY      THROMBECTOMY         Past Medical History:   Diagnosis Date    Hyperlipidemia     Myocardial infarction     Spinal stenosis, multiple sites in spine     STEMI (ST elevation myocardial infarction)     Thrombus        Family History   Problem Relation Age of Onset    Lung cancer Mother     Heart disease Father        Social History     Socioeconomic History    Marital status:    Tobacco Use    Smoking status: Every Day     Types: Cigarettes     Passive exposure: Current    Smokeless tobacco: Never   Vaping Use    Vaping status: Never Used   Substance and Sexual Activity    Alcohol use: Not Currently    Drug use: Not Currently     Types: Marijuana    Sexual activity: Defer               Objective:       Physical Exam    /77   Pulse 65   Ht 175.3 cm (69\")   Wt 66.7 kg (147 lb)   SpO2 97%   BMI 21.71 kg/m²   The patient is alert, oriented and in no distress.    Vital signs as noted above.    Head and neck revealed no carotid bruits or jugular venous distension.  No thyromegaly or lymphadenopathy is present.    Lungs clear.  No wheezing.  Breath sounds are normal bilaterally.    Heart normal " first and second heart sounds.  No murmur..  No pericardial rub is present.  No gallop is present.    Abdomen soft and nontender.  No organomegaly is present.    Extremities revealed good peripheral pulses without any pedal edema.    Skin warm and dry.    Musculoskeletal system is grossly normal.    CNS grossly normal.           Diagnosis Plan   1. STEMI involving left anterior descending coronary artery        2. Essential hypertension        3. Mixed hyperlipidemia        4. Tobacco abuse        LAB RESULTS (LAST 7 DAYS)    CBC        BMP        CMP         BNP        TROPONIN        CoAg        Creatinine Clearance  CrCl cannot be calculated (Patient's most recent lab result is older than the maximum 30 days allowed.).    ABG        Radiology  No radiology results for the last day    EKG    ECG 12 Lead    Date/Time: 11/12/2024 1:33 PM  Performed by: Oscar Graves MD    Authorized by: Oscar Graves MD  Comparison: compared with previous ECG   Similar to previous ECG  Comments: ECG shows normal sinus rhythm, early R wave progression.  Heart rate 64, NY interval 147, QRS 79 and QTc 404 ms.          Stress test      Echocardiogram      Cardiac catheterization  No results found for this or any previous visit.          Assessment and Plan       Diagnoses and all orders for this visit:    1. STEMI involving left anterior descending coronary artery (Primary)    2. Essential hypertension    3. Mixed hyperlipidemia    4. Tobacco abuse         Coronary artery disease  Status post PCI of the LAD  Continue aspirin, statin and beta-blocker.  Will consider switching to Plavix after his colonoscopy.     Hyperlipidemia  Continue atorvastatin 80 mg  Add Zetia 10 mg  Goal LDL less than 70  Previous  in 2023.     Hypertension  Well-controlled on amlodipine     Tobacco abuse  I have provided smoking cessation counseling for the patient today. I extensively discussed with the patient the cardiovascular risks associated with  smoking and other tobacco products. Patient stated understanding and their questions were answered to their satisfaction.  Used to smoke 2 packs/day.  Now smokes 1 pack/day.

## 2024-11-12 ENCOUNTER — OFFICE VISIT (OUTPATIENT)
Dept: CARDIOLOGY | Facility: CLINIC | Age: 64
End: 2024-11-12
Payer: MEDICAID

## 2024-11-12 VITALS
WEIGHT: 147 LBS | HEART RATE: 65 BPM | OXYGEN SATURATION: 97 % | HEIGHT: 69 IN | SYSTOLIC BLOOD PRESSURE: 127 MMHG | DIASTOLIC BLOOD PRESSURE: 77 MMHG | BODY MASS INDEX: 21.77 KG/M2

## 2024-11-12 DIAGNOSIS — E78.2 MIXED HYPERLIPIDEMIA: ICD-10-CM

## 2024-11-12 DIAGNOSIS — Z72.0 TOBACCO ABUSE: ICD-10-CM

## 2024-11-12 DIAGNOSIS — I10 ESSENTIAL HYPERTENSION: ICD-10-CM

## 2024-11-12 DIAGNOSIS — I21.02 STEMI INVOLVING LEFT ANTERIOR DESCENDING CORONARY ARTERY: Primary | ICD-10-CM

## 2024-11-12 PROCEDURE — 99214 OFFICE O/P EST MOD 30 MIN: CPT | Performed by: INTERNAL MEDICINE

## 2024-11-12 PROCEDURE — 93000 ELECTROCARDIOGRAM COMPLETE: CPT | Performed by: INTERNAL MEDICINE

## 2024-11-12 PROCEDURE — 1159F MED LIST DOCD IN RCRD: CPT | Performed by: INTERNAL MEDICINE

## 2024-11-12 PROCEDURE — 3074F SYST BP LT 130 MM HG: CPT | Performed by: INTERNAL MEDICINE

## 2024-11-12 PROCEDURE — 3078F DIAST BP <80 MM HG: CPT | Performed by: INTERNAL MEDICINE

## 2024-11-12 PROCEDURE — 1160F RVW MEDS BY RX/DR IN RCRD: CPT | Performed by: INTERNAL MEDICINE

## 2024-11-12 RX ORDER — NITROGLYCERIN 0.4 MG/1
0.4 TABLET SUBLINGUAL
Qty: 30 TABLET | Refills: 11 | Status: SHIPPED | OUTPATIENT
Start: 2024-11-12

## 2025-02-03 PROCEDURE — 88305 TISSUE EXAM BY PATHOLOGIST: CPT | Performed by: INTERNAL MEDICINE

## 2025-02-04 ENCOUNTER — LAB REQUISITION (OUTPATIENT)
Dept: LAB | Facility: HOSPITAL | Age: 65
End: 2025-02-04
Payer: MEDICAID

## 2025-02-04 DIAGNOSIS — Z80.0 FAMILY HISTORY OF MALIGNANT NEOPLASM OF DIGESTIVE ORGANS: ICD-10-CM

## 2025-02-05 LAB
LAB AP CASE REPORT: NORMAL
PATH REPORT.FINAL DX SPEC: NORMAL
PATH REPORT.GROSS SPEC: NORMAL

## 2025-03-22 ENCOUNTER — APPOINTMENT (OUTPATIENT)
Dept: GENERAL RADIOLOGY | Facility: HOSPITAL | Age: 65
End: 2025-03-22
Payer: MEDICAID

## 2025-03-22 ENCOUNTER — HOSPITAL ENCOUNTER (OUTPATIENT)
Facility: HOSPITAL | Age: 65
Setting detail: OBSERVATION
Discharge: HOME | End: 2025-03-24
Attending: INTERNAL MEDICINE | Admitting: INTERNAL MEDICINE
Payer: MEDICAID

## 2025-03-22 DIAGNOSIS — R06.02 SHORTNESS OF BREATH: ICD-10-CM

## 2025-03-22 DIAGNOSIS — T59.811A SMOKE INHALATION: Primary | ICD-10-CM

## 2025-03-22 DIAGNOSIS — Z77.29 CARBON MONOXIDE EXPOSURE: ICD-10-CM

## 2025-03-22 LAB
ALBUMIN SERPL-MCNC: 4.7 G/DL (ref 3.5–5.2)
ALBUMIN/GLOB SERPL: 1.7 G/DL
ALP SERPL-CCNC: 76 U/L (ref 39–117)
ALT SERPL W P-5'-P-CCNC: 20 U/L (ref 1–41)
ANION GAP SERPL CALCULATED.3IONS-SCNC: 15.4 MMOL/L (ref 5–15)
ARTERIAL PATENCY WRIST A: POSITIVE
AST SERPL-CCNC: 31 U/L (ref 1–40)
ATMOSPHERIC PRESS: ABNORMAL MM[HG]
BASE EXCESS BLDA CALC-SCNC: -1 MMOL/L (ref 0–3)
BASOPHILS # BLD AUTO: 0.11 10*3/MM3 (ref 0–0.2)
BASOPHILS NFR BLD AUTO: 0.9 % (ref 0–1.5)
BDY SITE: ABNORMAL
BILIRUB SERPL-MCNC: 0.4 MG/DL (ref 0–1.2)
BUN SERPL-MCNC: 9 MG/DL (ref 8–23)
BUN/CREAT SERPL: 6.2 (ref 7–25)
CALCIUM SPEC-SCNC: 9.4 MG/DL (ref 8.6–10.5)
CHLORIDE SERPL-SCNC: 99 MMOL/L (ref 98–107)
CO2 BLDA-SCNC: 24.5 MMOL/L (ref 22–29)
CO2 SERPL-SCNC: 23.6 MMOL/L (ref 22–29)
COHGB MFR BLD: 15.8 % (ref 0–3)
CREAT SERPL-MCNC: 1.45 MG/DL (ref 0.76–1.27)
DEPRECATED RDW RBC AUTO: 47.4 FL (ref 37–54)
EGFRCR SERPLBLD CKD-EPI 2021: 53.8 ML/MIN/1.73
EOSINOPHIL # BLD AUTO: 0.15 10*3/MM3 (ref 0–0.4)
EOSINOPHIL NFR BLD AUTO: 1.2 % (ref 0.3–6.2)
ERYTHROCYTE [DISTWIDTH] IN BLOOD BY AUTOMATED COUNT: 14.1 % (ref 12.3–15.4)
GLOBULIN UR ELPH-MCNC: 2.8 GM/DL
GLUCOSE SERPL-MCNC: 90 MG/DL (ref 65–99)
HCO3 BLDA-SCNC: 23.4 MMOL/L (ref 21–28)
HCT VFR BLD AUTO: 50.1 % (ref 37.5–51)
HEMODILUTION: NO
HGB BLD-MCNC: 16.8 G/DL (ref 13–17.7)
HOLD SPECIMEN: NORMAL
HOLD SPECIMEN: NORMAL
IMM GRANULOCYTES # BLD AUTO: 0.05 10*3/MM3 (ref 0–0.05)
IMM GRANULOCYTES NFR BLD AUTO: 0.4 % (ref 0–0.5)
LYMPHOCYTES # BLD AUTO: 3.42 10*3/MM3 (ref 0.7–3.1)
LYMPHOCYTES NFR BLD AUTO: 28.3 % (ref 19.6–45.3)
MCH RBC QN AUTO: 30.9 PG (ref 26.6–33)
MCHC RBC AUTO-ENTMCNC: 33.5 G/DL (ref 31.5–35.7)
MCV RBC AUTO: 92.1 FL (ref 79–97)
MODALITY: ABNORMAL
MONOCYTES # BLD AUTO: 0.74 10*3/MM3 (ref 0.1–0.9)
MONOCYTES NFR BLD AUTO: 6.1 % (ref 5–12)
NEUTROPHILS NFR BLD AUTO: 63.1 % (ref 42.7–76)
NEUTROPHILS NFR BLD AUTO: 7.6 10*3/MM3 (ref 1.7–7)
NRBC BLD AUTO-RTO: 0 /100 WBC (ref 0–0.2)
PCO2 BLDA: 37.6 MM HG (ref 35–48)
PH BLDA: 7.4 PH UNITS (ref 7.35–7.45)
PLATELET # BLD AUTO: 264 10*3/MM3 (ref 140–450)
PMV BLD AUTO: 10 FL (ref 6–12)
PO2 BLDA: 188.1 MM HG (ref 83–108)
POTASSIUM SERPL-SCNC: 3.7 MMOL/L (ref 3.5–5.2)
PROT SERPL-MCNC: 7.5 G/DL (ref 6–8.5)
RBC # BLD AUTO: 5.44 10*6/MM3 (ref 4.14–5.8)
SAO2 % BLDCOA: 99.7 % (ref 94–98)
SODIUM SERPL-SCNC: 138 MMOL/L (ref 136–145)
WBC NRBC COR # BLD AUTO: 12.07 10*3/MM3 (ref 3.4–10.8)
WHOLE BLOOD HOLD COAG: NORMAL
WHOLE BLOOD HOLD SPECIMEN: NORMAL

## 2025-03-22 PROCEDURE — 85025 COMPLETE CBC W/AUTO DIFF WBC: CPT

## 2025-03-22 PROCEDURE — 99285 EMERGENCY DEPT VISIT HI MDM: CPT

## 2025-03-22 PROCEDURE — 36600 WITHDRAWAL OF ARTERIAL BLOOD: CPT

## 2025-03-22 PROCEDURE — G0378 HOSPITAL OBSERVATION PER HR: HCPCS

## 2025-03-22 PROCEDURE — 82375 ASSAY CARBOXYHB QUANT: CPT

## 2025-03-22 PROCEDURE — 82803 BLOOD GASES ANY COMBINATION: CPT

## 2025-03-22 PROCEDURE — 80053 COMPREHEN METABOLIC PANEL: CPT

## 2025-03-22 PROCEDURE — 71045 X-RAY EXAM CHEST 1 VIEW: CPT

## 2025-03-22 RX ORDER — ACETAMINOPHEN 325 MG/1
650 TABLET ORAL EVERY 4 HOURS PRN
Status: DISCONTINUED | OUTPATIENT
Start: 2025-03-22 | End: 2025-03-24 | Stop reason: HOSPADM

## 2025-03-22 RX ORDER — SODIUM CHLORIDE 0.9 % (FLUSH) 0.9 %
10 SYRINGE (ML) INJECTION AS NEEDED
Status: DISCONTINUED | OUTPATIENT
Start: 2025-03-22 | End: 2025-03-24 | Stop reason: HOSPADM

## 2025-03-22 RX ORDER — BISACODYL 10 MG
10 SUPPOSITORY, RECTAL RECTAL DAILY PRN
Status: DISCONTINUED | OUTPATIENT
Start: 2025-03-22 | End: 2025-03-24 | Stop reason: HOSPADM

## 2025-03-22 RX ORDER — SODIUM CHLORIDE 0.9 % (FLUSH) 0.9 %
10 SYRINGE (ML) INJECTION EVERY 12 HOURS SCHEDULED
Status: DISCONTINUED | OUTPATIENT
Start: 2025-03-22 | End: 2025-03-24 | Stop reason: HOSPADM

## 2025-03-22 RX ORDER — POLYETHYLENE GLYCOL 3350 17 G/17G
17 POWDER, FOR SOLUTION ORAL DAILY PRN
Status: DISCONTINUED | OUTPATIENT
Start: 2025-03-22 | End: 2025-03-24 | Stop reason: HOSPADM

## 2025-03-22 RX ORDER — AMOXICILLIN 250 MG
2 CAPSULE ORAL 2 TIMES DAILY PRN
Status: DISCONTINUED | OUTPATIENT
Start: 2025-03-22 | End: 2025-03-24 | Stop reason: HOSPADM

## 2025-03-22 RX ORDER — ACETAMINOPHEN 500 MG
1000 TABLET ORAL ONCE
Status: COMPLETED | OUTPATIENT
Start: 2025-03-22 | End: 2025-03-22

## 2025-03-22 RX ORDER — NITROGLYCERIN 0.4 MG/1
0.4 TABLET SUBLINGUAL
Status: DISCONTINUED | OUTPATIENT
Start: 2025-03-22 | End: 2025-03-24 | Stop reason: HOSPADM

## 2025-03-22 RX ORDER — SODIUM CHLORIDE 9 MG/ML
40 INJECTION, SOLUTION INTRAVENOUS AS NEEDED
Status: DISCONTINUED | OUTPATIENT
Start: 2025-03-22 | End: 2025-03-24 | Stop reason: HOSPADM

## 2025-03-22 RX ORDER — BISACODYL 5 MG/1
5 TABLET, DELAYED RELEASE ORAL DAILY PRN
Status: DISCONTINUED | OUTPATIENT
Start: 2025-03-22 | End: 2025-03-24 | Stop reason: HOSPADM

## 2025-03-22 RX ORDER — ONDANSETRON 4 MG/1
4 TABLET, ORALLY DISINTEGRATING ORAL EVERY 6 HOURS PRN
Status: DISCONTINUED | OUTPATIENT
Start: 2025-03-22 | End: 2025-03-24 | Stop reason: HOSPADM

## 2025-03-22 RX ORDER — ACETAMINOPHEN 160 MG/5ML
650 SOLUTION ORAL EVERY 4 HOURS PRN
Status: DISCONTINUED | OUTPATIENT
Start: 2025-03-22 | End: 2025-03-24 | Stop reason: HOSPADM

## 2025-03-22 RX ORDER — ONDANSETRON 2 MG/ML
4 INJECTION INTRAMUSCULAR; INTRAVENOUS EVERY 6 HOURS PRN
Status: DISCONTINUED | OUTPATIENT
Start: 2025-03-22 | End: 2025-03-24 | Stop reason: HOSPADM

## 2025-03-22 RX ORDER — ACETAMINOPHEN 650 MG/1
650 SUPPOSITORY RECTAL EVERY 4 HOURS PRN
Status: DISCONTINUED | OUTPATIENT
Start: 2025-03-22 | End: 2025-03-24 | Stop reason: HOSPADM

## 2025-03-22 RX ORDER — HYDRALAZINE HYDROCHLORIDE 20 MG/ML
10 INJECTION INTRAMUSCULAR; INTRAVENOUS EVERY 6 HOURS PRN
Status: DISCONTINUED | OUTPATIENT
Start: 2025-03-22 | End: 2025-03-24 | Stop reason: HOSPADM

## 2025-03-22 RX ADMIN — ACETAMINOPHEN 1000 MG: 500 TABLET, FILM COATED ORAL at 21:12

## 2025-03-23 PROBLEM — J68.0: Status: ACTIVE | Noted: 2025-03-23

## 2025-03-23 LAB — COHGB MFR BLD: 3.5 % (ref 0–3)

## 2025-03-23 PROCEDURE — G0378 HOSPITAL OBSERVATION PER HR: HCPCS

## 2025-03-23 PROCEDURE — 82375 ASSAY CARBOXYHB QUANT: CPT | Performed by: NURSE PRACTITIONER

## 2025-03-23 PROCEDURE — 96374 THER/PROPH/DIAG INJ IV PUSH: CPT

## 2025-03-23 PROCEDURE — 94761 N-INVAS EAR/PLS OXIMETRY MLT: CPT

## 2025-03-23 PROCEDURE — 94640 AIRWAY INHALATION TREATMENT: CPT

## 2025-03-23 PROCEDURE — 36415 COLL VENOUS BLD VENIPUNCTURE: CPT

## 2025-03-23 PROCEDURE — 25010000002 METHYLPREDNISOLONE PER 40 MG: Performed by: FAMILY MEDICINE

## 2025-03-23 PROCEDURE — 94799 UNLISTED PULMONARY SVC/PX: CPT

## 2025-03-23 PROCEDURE — 25810000003 SODIUM CHLORIDE 0.9 % SOLUTION: Performed by: FAMILY MEDICINE

## 2025-03-23 PROCEDURE — 94664 DEMO&/EVAL PT USE INHALER: CPT

## 2025-03-23 RX ORDER — ASPIRIN 81 MG/1
81 TABLET ORAL DAILY
Status: DISCONTINUED | OUTPATIENT
Start: 2025-03-23 | End: 2025-03-24 | Stop reason: HOSPADM

## 2025-03-23 RX ORDER — METOPROLOL SUCCINATE 25 MG/1
25 TABLET, EXTENDED RELEASE ORAL DAILY
Status: DISCONTINUED | OUTPATIENT
Start: 2025-03-23 | End: 2025-03-24 | Stop reason: HOSPADM

## 2025-03-23 RX ORDER — PANTOPRAZOLE SODIUM 40 MG/1
40 TABLET, DELAYED RELEASE ORAL
Status: DISCONTINUED | OUTPATIENT
Start: 2025-03-24 | End: 2025-03-24 | Stop reason: HOSPADM

## 2025-03-23 RX ORDER — AMLODIPINE BESYLATE 5 MG/1
10 TABLET ORAL DAILY
Status: DISCONTINUED | OUTPATIENT
Start: 2025-03-23 | End: 2025-03-24 | Stop reason: HOSPADM

## 2025-03-23 RX ORDER — ATORVASTATIN CALCIUM 40 MG/1
80 TABLET, FILM COATED ORAL NIGHTLY
Status: DISCONTINUED | OUTPATIENT
Start: 2025-03-23 | End: 2025-03-24 | Stop reason: HOSPADM

## 2025-03-23 RX ORDER — IPRATROPIUM BROMIDE AND ALBUTEROL SULFATE 2.5; .5 MG/3ML; MG/3ML
3 SOLUTION RESPIRATORY (INHALATION)
Status: DISCONTINUED | OUTPATIENT
Start: 2025-03-23 | End: 2025-03-24 | Stop reason: HOSPADM

## 2025-03-23 RX ORDER — BENZONATATE 100 MG/1
100 CAPSULE ORAL 3 TIMES DAILY PRN
Status: DISCONTINUED | OUTPATIENT
Start: 2025-03-23 | End: 2025-03-24 | Stop reason: HOSPADM

## 2025-03-23 RX ORDER — METHYLPREDNISOLONE SODIUM SUCCINATE 40 MG/ML
20 INJECTION, POWDER, LYOPHILIZED, FOR SOLUTION INTRAMUSCULAR; INTRAVENOUS EVERY 12 HOURS
Status: DISCONTINUED | OUTPATIENT
Start: 2025-03-23 | End: 2025-03-24 | Stop reason: HOSPADM

## 2025-03-23 RX ORDER — SODIUM CHLORIDE 9 MG/ML
50 INJECTION, SOLUTION INTRAVENOUS CONTINUOUS
Status: DISCONTINUED | OUTPATIENT
Start: 2025-03-23 | End: 2025-03-24 | Stop reason: HOSPADM

## 2025-03-23 RX ADMIN — METOPROLOL SUCCINATE 25 MG: 25 TABLET, EXTENDED RELEASE ORAL at 16:45

## 2025-03-23 RX ADMIN — IPRATROPIUM BROMIDE AND ALBUTEROL SULFATE 3 ML: .5; 3 SOLUTION RESPIRATORY (INHALATION) at 21:11

## 2025-03-23 RX ADMIN — ATORVASTATIN CALCIUM 80 MG: 40 TABLET, FILM COATED ORAL at 20:05

## 2025-03-23 RX ADMIN — Medication 10 ML: at 09:46

## 2025-03-23 RX ADMIN — METHYLPREDNISOLONE SODIUM SUCCINATE 20 MG: 40 INJECTION, POWDER, FOR SOLUTION INTRAMUSCULAR; INTRAVENOUS at 16:45

## 2025-03-23 RX ADMIN — ASPIRIN 81 MG: 81 TABLET, COATED ORAL at 09:46

## 2025-03-23 RX ADMIN — SODIUM CHLORIDE 50 ML/HR: 9 INJECTION, SOLUTION INTRAVENOUS at 16:45

## 2025-03-23 RX ADMIN — Medication 10 ML: at 20:05

## 2025-03-23 RX ADMIN — IPRATROPIUM BROMIDE AND ALBUTEROL SULFATE 3 ML: .5; 3 SOLUTION RESPIRATORY (INHALATION) at 12:20

## 2025-03-23 RX ADMIN — ATORVASTATIN CALCIUM 80 MG: 40 TABLET, FILM COATED ORAL at 01:44

## 2025-03-23 NOTE — PLAN OF CARE
Goal Outcome Evaluation:      Ax4 nonrebreather mask, denies any pain. No issues overnight.
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
## 2025-03-23 NOTE — PLAN OF CARE
Goal Outcome Evaluation:  Plan of Care Reviewed With: patient           Outcome Evaluation: New patient admitted from ER
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
## 2025-03-24 VITALS
TEMPERATURE: 98 F | WEIGHT: 150 LBS | RESPIRATION RATE: 12 BRPM | OXYGEN SATURATION: 95 % | HEIGHT: 69 IN | SYSTOLIC BLOOD PRESSURE: 108 MMHG | DIASTOLIC BLOOD PRESSURE: 61 MMHG | BODY MASS INDEX: 22.22 KG/M2 | HEART RATE: 65 BPM

## 2025-03-24 LAB
ANION GAP SERPL CALCULATED.3IONS-SCNC: 10.6 MMOL/L (ref 5–15)
BUN SERPL-MCNC: 15 MG/DL (ref 8–23)
BUN/CREAT SERPL: 11.4 (ref 7–25)
CALCIUM SPEC-SCNC: 9.5 MG/DL (ref 8.6–10.5)
CHLORIDE SERPL-SCNC: 99 MMOL/L (ref 98–107)
CO2 SERPL-SCNC: 24.4 MMOL/L (ref 22–29)
CREAT SERPL-MCNC: 1.32 MG/DL (ref 0.76–1.27)
DEPRECATED RDW RBC AUTO: 50.1 FL (ref 37–54)
EGFRCR SERPLBLD CKD-EPI 2021: 60.2 ML/MIN/1.73
ERYTHROCYTE [DISTWIDTH] IN BLOOD BY AUTOMATED COUNT: 14.3 % (ref 12.3–15.4)
GEN 5 1HR TROPONIN T REFLEX: 11 NG/L
GLUCOSE SERPL-MCNC: 156 MG/DL (ref 65–99)
HCT VFR BLD AUTO: 45.7 % (ref 37.5–51)
HGB BLD-MCNC: 14.8 G/DL (ref 13–17.7)
MCH RBC QN AUTO: 30.5 PG (ref 26.6–33)
MCHC RBC AUTO-ENTMCNC: 32.4 G/DL (ref 31.5–35.7)
MCV RBC AUTO: 94 FL (ref 79–97)
PLATELET # BLD AUTO: 230 10*3/MM3 (ref 140–450)
PMV BLD AUTO: 10.1 FL (ref 6–12)
POTASSIUM SERPL-SCNC: 4.8 MMOL/L (ref 3.5–5.2)
RBC # BLD AUTO: 4.86 10*6/MM3 (ref 4.14–5.8)
SODIUM SERPL-SCNC: 134 MMOL/L (ref 136–145)
TROPONIN T NUMERIC DELTA: -3 NG/L
TROPONIN T SERPL HS-MCNC: 14 NG/L
WBC NRBC COR # BLD AUTO: 6.96 10*3/MM3 (ref 3.4–10.8)

## 2025-03-24 PROCEDURE — G0378 HOSPITAL OBSERVATION PER HR: HCPCS

## 2025-03-24 PROCEDURE — 85027 COMPLETE CBC AUTOMATED: CPT | Performed by: FAMILY MEDICINE

## 2025-03-24 PROCEDURE — 84484 ASSAY OF TROPONIN QUANT: CPT | Performed by: FAMILY MEDICINE

## 2025-03-24 PROCEDURE — 96376 TX/PRO/DX INJ SAME DRUG ADON: CPT

## 2025-03-24 PROCEDURE — 25010000002 METHYLPREDNISOLONE PER 40 MG: Performed by: FAMILY MEDICINE

## 2025-03-24 PROCEDURE — 80048 BASIC METABOLIC PNL TOTAL CA: CPT | Performed by: FAMILY MEDICINE

## 2025-03-24 RX ADMIN — METHYLPREDNISOLONE SODIUM SUCCINATE 20 MG: 40 INJECTION, POWDER, FOR SOLUTION INTRAMUSCULAR; INTRAVENOUS at 01:27

## 2025-03-24 RX ADMIN — PANTOPRAZOLE SODIUM 40 MG: 40 TABLET, DELAYED RELEASE ORAL at 05:43

## 2025-03-24 NOTE — CASE MANAGEMENT/SOCIAL WORK
Case Management Discharge Note      Final Note: D/C prior to CM assessment         Selected Continued Care - Discharged on 3/24/2025 Admission date: 3/22/2025 - Discharge disposition: Home or Self Care         Transportation Services  Private: Car    Final Discharge Disposition Code: 01 - home or self-care
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
## 2025-03-24 NOTE — PLAN OF CARE
Problem: Adult Inpatient Plan of Care  Goal: Plan of Care Review  Outcome: Progressing  Goal: Patient-Specific Goal (Individualized)  Outcome: Progressing  Goal: Absence of Hospital-Acquired Illness or Injury  Outcome: Progressing  Intervention: Identify and Manage Fall Risk  Recent Flowsheet Documentation  Taken 3/24/2025 0415 by Talya Arias LPN  Safety Promotion/Fall Prevention: safety round/check completed  Taken 3/24/2025 0215 by Talya Arias LPN  Safety Promotion/Fall Prevention: safety round/check completed  Taken 3/24/2025 0015 by Talya Arias LPN  Safety Promotion/Fall Prevention: safety round/check completed  Taken 3/23/2025 2230 by Talya Arias LPN  Safety Promotion/Fall Prevention: safety round/check completed  Taken 3/23/2025 2030 by Talya Arias LPN  Safety Promotion/Fall Prevention:   safety round/check completed   room organization consistent   nonskid shoes/slippers when out of bed   fall prevention program maintained   clutter free environment maintained   assistive device/personal items within reach  Intervention: Prevent Skin Injury  Recent Flowsheet Documentation  Taken 3/24/2025 0415 by Talya Arias LPN  Body Position: position changed independently  Taken 3/24/2025 0215 by Talya Arias LPN  Body Position: position changed independently  Taken 3/24/2025 0015 by Talya Arias LPN  Body Position: position changed independently  Taken 3/23/2025 2230 by Talya Arias LPN  Body Position: position changed independently  Taken 3/23/2025 2030 by Talya Arias LPN  Body Position: position changed independently  Skin Protection: incontinence pads utilized  Intervention: Prevent and Manage VTE (Venous Thromboembolism) Risk  Recent Flowsheet Documentation  Taken 3/23/2025 2030 by Talya Arias LPN  VTE Prevention/Management: SCDs (sequential compression devices) on  Intervention: Prevent Infection  Recent Flowsheet Documentation  Taken 3/23/2025 2030 by Hugo  GENESIS Babin  Infection Prevention: rest/sleep promoted  Goal: Optimal Comfort and Wellbeing  Outcome: Progressing  Intervention: Provide Person-Centered Care  Recent Flowsheet Documentation  Taken 3/23/2025 2030 by Talya Arias LPN  Trust Relationship/Rapport:   care explained   thoughts/feelings acknowledged  Goal: Readiness for Transition of Care  Outcome: Progressing   Goal Outcome Evaluation:
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
## 2025-05-07 ENCOUNTER — OFFICE VISIT (OUTPATIENT)
Dept: CARDIOLOGY | Facility: CLINIC | Age: 65
End: 2025-05-07
Payer: MEDICAID

## 2025-05-07 VITALS
SYSTOLIC BLOOD PRESSURE: 121 MMHG | HEART RATE: 62 BPM | DIASTOLIC BLOOD PRESSURE: 69 MMHG | HEIGHT: 69 IN | BODY MASS INDEX: 21.62 KG/M2 | OXYGEN SATURATION: 98 % | WEIGHT: 146 LBS

## 2025-05-07 DIAGNOSIS — I10 ESSENTIAL HYPERTENSION: ICD-10-CM

## 2025-05-07 DIAGNOSIS — I21.02 STEMI INVOLVING LEFT ANTERIOR DESCENDING CORONARY ARTERY: Primary | ICD-10-CM

## 2025-05-07 DIAGNOSIS — E78.2 MIXED HYPERLIPIDEMIA: ICD-10-CM

## 2025-05-07 DIAGNOSIS — Z72.0 TOBACCO ABUSE: ICD-10-CM

## 2025-05-07 PROCEDURE — 1160F RVW MEDS BY RX/DR IN RCRD: CPT | Performed by: INTERNAL MEDICINE

## 2025-05-07 PROCEDURE — 3074F SYST BP LT 130 MM HG: CPT | Performed by: INTERNAL MEDICINE

## 2025-05-07 PROCEDURE — 3078F DIAST BP <80 MM HG: CPT | Performed by: INTERNAL MEDICINE

## 2025-05-07 PROCEDURE — 99214 OFFICE O/P EST MOD 30 MIN: CPT | Performed by: INTERNAL MEDICINE

## 2025-05-07 PROCEDURE — 1159F MED LIST DOCD IN RCRD: CPT | Performed by: INTERNAL MEDICINE

## 2025-05-27 ENCOUNTER — TELEPHONE (OUTPATIENT)
Dept: CARDIOLOGY | Facility: CLINIC | Age: 65
End: 2025-05-27
Payer: MEDICAID

## 2025-05-27 RX ORDER — AMLODIPINE BESYLATE 10 MG/1
10 TABLET ORAL DAILY
Qty: 90 TABLET | Refills: 3 | Status: SHIPPED | OUTPATIENT
Start: 2025-05-27

## 2025-05-27 NOTE — TELEPHONE ENCOUNTER
Rx Refill Note  Requested Prescriptions     Pending Prescriptions Disp Refills    amLODIPine (NORVASC) 10 MG tablet 90 tablet 3     Sig: Take 1 tablet by mouth Daily.      Last office visit with prescribing clinician: 5/7/2025   Next office visit with prescribing clinician: 2/11/2026                         Ritu Barrow MA  05/27/25, 12:08 EDT